# Patient Record
Sex: MALE | Race: WHITE | NOT HISPANIC OR LATINO | Employment: PART TIME | URBAN - METROPOLITAN AREA
[De-identification: names, ages, dates, MRNs, and addresses within clinical notes are randomized per-mention and may not be internally consistent; named-entity substitution may affect disease eponyms.]

---

## 2017-06-07 ENCOUNTER — HOSPITAL ENCOUNTER (EMERGENCY)
Facility: HOSPITAL | Age: 22
Discharge: HOME | End: 2017-06-07
Attending: EMERGENCY MEDICINE | Admitting: EMERGENCY MEDICINE
Payer: COMMERCIAL

## 2017-06-07 ENCOUNTER — APPOINTMENT (EMERGENCY)
Dept: RADIOLOGY | Facility: HOSPITAL | Age: 22
End: 2017-06-07
Payer: COMMERCIAL

## 2017-06-07 VITALS
DIASTOLIC BLOOD PRESSURE: 82 MMHG | WEIGHT: 197 LBS | SYSTOLIC BLOOD PRESSURE: 138 MMHG | HEART RATE: 96 BPM | TEMPERATURE: 98.4 F | RESPIRATION RATE: 18 BRPM | OXYGEN SATURATION: 100 %

## 2017-06-07 VITALS
RESPIRATION RATE: 16 BRPM | OXYGEN SATURATION: 98 % | HEART RATE: 75 BPM | TEMPERATURE: 98.8 F | SYSTOLIC BLOOD PRESSURE: 146 MMHG | DIASTOLIC BLOOD PRESSURE: 70 MMHG

## 2017-06-07 DIAGNOSIS — S62.143A: Primary | ICD-10-CM

## 2017-06-07 DIAGNOSIS — S91.319A LACERATION OF FOOT: ICD-10-CM

## 2017-06-07 DIAGNOSIS — S60.229A HAND CONTUSION: Primary | ICD-10-CM

## 2017-06-07 DIAGNOSIS — S62.143A CLOSED HAMATE FRACTURE: ICD-10-CM

## 2017-06-07 PROCEDURE — 99283 EMERGENCY DEPT VISIT LOW MDM: CPT

## 2017-06-07 PROCEDURE — 73130 X-RAY EXAM OF HAND: CPT

## 2021-06-12 ENCOUNTER — APPOINTMENT (EMERGENCY)
Dept: RADIOLOGY | Facility: HOSPITAL | Age: 26
End: 2021-06-12
Payer: COMMERCIAL

## 2021-06-12 ENCOUNTER — HOSPITAL ENCOUNTER (EMERGENCY)
Facility: HOSPITAL | Age: 26
Discharge: HOME | End: 2021-06-12
Attending: EMERGENCY MEDICINE
Payer: COMMERCIAL

## 2021-06-12 VITALS
WEIGHT: 170 LBS | OXYGEN SATURATION: 100 % | RESPIRATION RATE: 18 BRPM | HEART RATE: 68 BPM | DIASTOLIC BLOOD PRESSURE: 82 MMHG | SYSTOLIC BLOOD PRESSURE: 139 MMHG

## 2021-06-12 DIAGNOSIS — R07.89 ATYPICAL CHEST PAIN: Primary | ICD-10-CM

## 2021-06-12 PROCEDURE — 93005 ELECTROCARDIOGRAM TRACING: CPT

## 2021-06-12 PROCEDURE — 99285 EMERGENCY DEPT VISIT HI MDM: CPT

## 2021-06-12 PROCEDURE — 99285 EMERGENCY DEPT VISIT HI MDM: CPT | Performed by: EMERGENCY MEDICINE

## 2021-06-12 PROCEDURE — 71045 X-RAY EXAM CHEST 1 VIEW: CPT

## 2021-06-12 PROCEDURE — 96372 THER/PROPH/DIAG INJ SC/IM: CPT

## 2021-06-12 RX ORDER — ACETAMINOPHEN 325 MG/1
650 TABLET ORAL ONCE
Status: COMPLETED | OUTPATIENT
Start: 2021-06-12 | End: 2021-06-12

## 2021-06-12 RX ORDER — KETOROLAC TROMETHAMINE 30 MG/ML
15 INJECTION, SOLUTION INTRAMUSCULAR; INTRAVENOUS ONCE
Status: COMPLETED | OUTPATIENT
Start: 2021-06-12 | End: 2021-06-12

## 2021-06-12 RX ADMIN — ACETAMINOPHEN 650 MG: 325 TABLET, FILM COATED ORAL at 18:01

## 2021-06-12 RX ADMIN — KETOROLAC TROMETHAMINE 15 MG: 30 INJECTION, SOLUTION INTRAMUSCULAR at 18:01

## 2021-06-12 NOTE — ED ATTENDING ATTESTATION
6/12/2021  IJose Ramon MD, saw and evaluated the patient  I have discussed the patient with the resident/non-physician practitioner and agree with the resident's/non-physician practitioner's findings, Plan of Care, and MDM as documented in the resident's/non-physician practitioner's note, except where noted  All available labs and Radiology studies were reviewed  I was present for key portions of any procedure(s) performed by the resident/non-physician practitioner and I was immediately available to provide assistance  At this point I agree with the current assessment done in the Emergency Department    I have conducted an independent evaluation of this patient a history and physical is as follows:  Sharp cp  Anterior   Worse to move or twist   No fever  Too motrin with improvement    No sob  No fever no cough  No leg pain or swelling   Had one dose of moderna vaccine 2 weeks ago   For covid 19   No loss of taste or smell   Uses THC   Non smoker    No PE or DVT risk   EXAM:   Const:   well appearing   NAD     HEENT:  NCAT    sclera anicteric conjunctiva pink   throat clear, MMM    Neck:   supple  no meningismus  no jvd   no bruits  no  midline tenderness   Lungs:   clear  CW non-tender   No creiptation  Heart:   RRR no m/g/r  Normal pulses  Abd:   soft nt nd pos bs   Ext:    normal nontender  No edema  Neruo:   CN 2 -12 intact  motor intact 5/5 sensory intact cerebellar intact       Gait normal    IMPRESSION:  Chest pain nonspecific  PLAN:  EKG shows normal sinus rhythm with no acute ischemic changes early repolarization noted RSR prime pattern no OR  depression noted  Plan chest x-ray rule out infiltrate or pneumothorax   Toradol Tylenol  ED Course         Critical Care Time  Procedures

## 2021-06-12 NOTE — ED PROVIDER NOTES
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  Patient reports that the pain does not radiate anywhere else  It is worse when he changes positions  He thinks that is also slightly worse when he exerts himself, but his main complaint is positional chest pain  It is not pleuritic  Patient has no associated symptoms like shortness of breath, nausea, vomiting, lightheadedness, or dizziness  He has never experienced this before  Patient reports no recent heavy lifting or straining, but says the pain does feel like a pulled muscle  He tried taking ibuprofen for the pain and it helped a little  Patient has no cardiac family history at a young age  Patient smokes marijuana drinks alcohol a couple times a week  No recent fever, chills, or other recent URI symptoms  Patient has no recent long car/plane rides or surgeries  No leg swelling  Patient also reports that he received the 1st dose of his Moderna vaccine a couple weeks ago  None       History reviewed  No pertinent past medical history  History reviewed  No pertinent surgical history  History reviewed  No pertinent family history  I have reviewed and agree with the history as documented  E-Cigarette/Vaping     E-Cigarette/Vaping Substances     Social History     Tobacco Use    Smoking status: Former Smoker    Smokeless tobacco: Never Used   Substance Use Topics    Alcohol use: No    Drug use: Yes     Types: Marijuana        Review of Systems   Constitutional: Negative for appetite change, chills, fatigue and fever  HENT: Negative  Eyes: Negative  Respiratory: Negative for cough, chest tightness and shortness of breath  Cardiovascular: Positive for chest pain  Negative for palpitations and leg swelling  Gastrointestinal: Negative for abdominal pain, diarrhea, nausea and vomiting  Endocrine: Negative  Genitourinary: Negative for difficulty urinating and hematuria  Musculoskeletal: Negative for arthralgias and myalgias  Skin: Negative for pallor and rash  Allergic/Immunologic: Negative  Neurological: Negative for dizziness, weakness, light-headedness and headaches  Hematological: Negative  Physical Exam  ED Triage Vitals [06/12/21 1735]   Temp Pulse Respirations Blood Pressure SpO2   -- 68 18 139/82 100 %      Temp src Heart Rate Source Patient Position - Orthostatic VS BP Location FiO2 (%)   -- Monitor Lying Right arm --      Pain Score       2             Orthostatic Vital Signs  Vitals:    06/12/21 1735   BP: 139/82   Pulse: 68   Patient Position - Orthostatic VS: Lying       Physical Exam  Vitals signs and nursing note reviewed  Constitutional:       General: He is not in acute distress  Appearance: Normal appearance  He is well-developed  He is not ill-appearing  HENT:      Head: Normocephalic and atraumatic  Eyes:      Conjunctiva/sclera: Conjunctivae normal    Neck:      Musculoskeletal: Normal range of motion and neck supple  Vascular: No JVD  Cardiovascular:      Rate and Rhythm: Normal rate and regular rhythm  Heart sounds: No murmur  No friction rub  Pulmonary:      Effort: Pulmonary effort is normal  No tachypnea or respiratory distress  Breath sounds: Normal breath sounds  No decreased breath sounds, wheezing, rhonchi or rales  Musculoskeletal: Normal range of motion  Skin:     General: Skin is warm and dry  Neurological:      General: No focal deficit present  Mental Status: He is alert and oriented to person, place, and time           ED Medications  Medications   ketorolac (TORADOL) injection 15 mg (15 mg Intramuscular Given 6/12/21 1801)   acetaminophen (TYLENOL) tablet 650 mg (650 mg Oral Given 6/12/21 1801)       Diagnostic Studies  Results Reviewed     None                 XR chest 1 view portable   ED Interpretation by Ludivina Davey DO (06/12 1838)   The trachea and bronchi are midline  The lung markings are normal  The lung fields are normal  The costophrenic angles are not blunted  The position of the diaphragm is normal  The heart size and cardiac silhouette is normal  Examination of the bones shows no acute fractures  No acute cardiopulmonary process  Final Result by Horacio Viveros MD (06/12 1841)      No acute cardiopulmonary disease  Workstation performed: [id number]               Procedures  Procedures      ED Course  ED Course as of Jun 12 1908   Sat Jun 12, 2021 1802 The heart rate is 80, which is normal  The rhythm is regular  The axis is normal  The P waves are normal and the NC interval is normal  The QRS height is normal and width is normal  The ST segments are not elevated or depressed  The T waves are normal  The QT segment is normal  This ecg shows sinus rhythm  ECG 12 lead   1838 Pt feeling better after meds                                          MDM  Number of Diagnoses or Management Options  Atypical chest pain: established and improving  Diagnosis management comments: 61-year-old male presents with chest pain  Low risk patient and no early family history of cardiac disease  Will check EKG and chest x-ray  Will give Toradol and Tylenol for pain and re-evaluate         Amount and/or Complexity of Data Reviewed  Clinical lab tests: ordered and reviewed  Tests in the radiology section of CPT®: ordered and reviewed  Review and summarize past medical records: yes  Discuss the patient with other providers: yes    Risk of Complications, Morbidity, and/or Mortality  Presenting problems: low  Diagnostic procedures: low  Management options: low    Patient Progress  Patient progress: improved    Patient felt better after receiving medications here  His EKG and chest x-rays showed no concerning acute pathology  He was told his pain is likely musculoskeletal   Patient was told to follow-up with his primary care physician  Return precautions were given  Patient was told to take Advil or Motrin for pain  Disposition  Final diagnoses:   Atypical chest pain     Time reflects when diagnosis was documented in both MDM as applicable and the Disposition within this note     Time User Action Codes Description Comment    6/12/2021  6:40 PM Rikki Shaun Add [R07 89] Atypical chest pain       ED Disposition     ED Disposition Condition Date/Time Comment    Discharge Good Sat Jun 12, 2021  6:40 PM [facility] [place] discharge to home/self care  Follow-up Information     Follow up With Specialties Details Why Contact Info Additional [facility] Schedule an appointment as soon as possible for a visit in 1 week  [address], [address] [facility] [id number]  [address], [address], [address] [place], [place], [address] [place] [address] Avenue          There are no discharge medications for this patient  No discharge procedures on file  PDMP Review     None           ED Provider  Attending physically available and evaluated [facility]  I managed the patient along with the ED Attending      Electronically Signed by         Karey Steele DO  06/12/21 9470

## 2021-06-12 NOTE — DISCHARGE INSTRUCTIONS
You have been seen for chest pain, likely musculoskeletal in origin  You should return to the ED if you develop worsening pain, shortness of breath, lightheadedness, syncope, or other worsening symptoms  Follow up with your primary care physician  Take Advil or Motrin for pain

## 2021-06-13 LAB
ATRIAL RATE: 83 BPM
P AXIS: 78 DEGREES
PR INTERVAL: 144 MS
QRS AXIS: 75 DEGREES
QRSD INTERVAL: 100 MS
QT INTERVAL: 346 MS
QTC INTERVAL: 406 MS
T WAVE AXIS: 73 DEGREES
VENTRICULAR RATE: 83 BPM

## 2021-06-13 PROCEDURE — 93010 ELECTROCARDIOGRAM REPORT: CPT | Performed by: INTERNAL MEDICINE
